# Patient Record
Sex: MALE | HISPANIC OR LATINO | Employment: FULL TIME | URBAN - METROPOLITAN AREA
[De-identification: names, ages, dates, MRNs, and addresses within clinical notes are randomized per-mention and may not be internally consistent; named-entity substitution may affect disease eponyms.]

---

## 2021-02-01 ENCOUNTER — TELEPHONE (OUTPATIENT)
Dept: SCHEDULING | Facility: IMAGING CENTER | Age: 36
End: 2021-02-01

## 2021-02-19 ENCOUNTER — OFFICE VISIT (OUTPATIENT)
Dept: MEDICAL GROUP | Facility: MEDICAL CENTER | Age: 36
End: 2021-02-19
Payer: COMMERCIAL

## 2021-02-19 VITALS
OXYGEN SATURATION: 96 % | WEIGHT: 167 LBS | HEART RATE: 77 BPM | BODY MASS INDEX: 21.43 KG/M2 | SYSTOLIC BLOOD PRESSURE: 100 MMHG | HEIGHT: 74 IN | DIASTOLIC BLOOD PRESSURE: 60 MMHG | TEMPERATURE: 97.9 F | RESPIRATION RATE: 16 BRPM

## 2021-02-19 DIAGNOSIS — F31.12 BIPOLAR AFFECTIVE DISORDER, CURRENTLY MANIC, MODERATE (HCC): ICD-10-CM

## 2021-02-19 DIAGNOSIS — R74.01 TRANSAMINITIS: ICD-10-CM

## 2021-02-19 DIAGNOSIS — Z23 NEED FOR VACCINATION: ICD-10-CM

## 2021-02-19 DIAGNOSIS — Z00.00 PREVENTATIVE HEALTH CARE: ICD-10-CM

## 2021-02-19 DIAGNOSIS — F11.21 HISTORY OF NARCOTIC ADDICTION (HCC): ICD-10-CM

## 2021-02-19 PROBLEM — F31.9 BIPOLAR DISORDER (HCC): Status: ACTIVE | Noted: 2021-02-19

## 2021-02-19 PROBLEM — F10.21 HISTORY OF ALCOHOL DEPENDENCE (HCC): Status: ACTIVE | Noted: 2021-02-19

## 2021-02-19 PROCEDURE — 90471 IMMUNIZATION ADMIN: CPT | Performed by: PHYSICIAN ASSISTANT

## 2021-02-19 PROCEDURE — 99204 OFFICE O/P NEW MOD 45 MIN: CPT | Mod: 25 | Performed by: PHYSICIAN ASSISTANT

## 2021-02-19 PROCEDURE — 90715 TDAP VACCINE 7 YRS/> IM: CPT | Performed by: PHYSICIAN ASSISTANT

## 2021-02-19 RX ORDER — NALTREXONE HYDROCHLORIDE 50 MG/1
50 TABLET, FILM COATED ORAL
COMMUNITY
Start: 2021-02-17

## 2021-02-19 RX ORDER — RISPERIDONE 2 MG/1
2 TABLET ORAL
COMMUNITY
Start: 2021-02-05

## 2021-02-19 RX ORDER — TRAZODONE HYDROCHLORIDE 300 MG/1
300 TABLET ORAL
COMMUNITY
Start: 2021-02-17

## 2021-02-19 ASSESSMENT — PATIENT HEALTH QUESTIONNAIRE - PHQ9
SUM OF ALL RESPONSES TO PHQ QUESTIONS 1-9: 12
CLINICAL INTERPRETATION OF PHQ2 SCORE: 2
5. POOR APPETITE OR OVEREATING: 2 - MORE THAN HALF THE DAYS

## 2021-02-19 NOTE — PROGRESS NOTES
Subjective:   CC:   Chief Complaint   Patient presents with   • Establish Care       Jean Pierre Duffy is a 36 y.o. male here today for establishing care.       HPI:  Patient has bipolar disorder that was diagnosed at age of 11.  Currently sees a psychiatrist, and takes risperidone and trazodone.  Patient has history of opiate dependency, shooting up heroin.  States once he stopped that gradually he started drinking heavily.  He was recently at [facility], for alcoholism rehab.  You should drink more than 750 ml of hard liquor daily.  He has had some relapses, his last drink was last night 4 drinks.  Last blood work shows sever  Elevation of AST and ALT.denies any right upper quadrant pain.  States his appetite is normal.  Currently takes naltrexone 50 mg daily to help with craving for alcohol.  States he has a good family support.    Current medicines (including changes today)  Current Outpatient Medications   Medication Sig Dispense Refill   • risperiDONE (RISPERDAL) 2 MG Tab Take 2 mg by mouth.     • trazodone (DESYREL) 300 MG tablet Take 300 mg by mouth.     • naltrexone (DEPADE) 50 MG Tab Take 50 mg by mouth.     • FOLIC ACID PO Take  by mouth.     • MAGNESIUM PO Take  by mouth.     • MILK THISTLE PO Take  by mouth.     • Ascorbic Acid (VITAMIN C PO) Take  by mouth.     • VITAMIN D PO Take  by mouth.     • ondansetron (ZOFRAN ODT) 4 MG TBDP Take 1 Tab by mouth every 8 hours as needed for Nausea/Vomiting. (Patient not taking: Reported on 2/19/2021) 10 Tab 0   • LITHIUM CARBONATE 300 MG PO TABS      • SEROQUEL 100 MG PO TABS 2 times a day.      • IBUPROFEN        No current facility-administered medications for this visit.         Past medical, surgical, family, and social history are reviewed in Epic chart by me today.   Medications and allergies reviewed in Epic chart by me today.       ROS   No chest pain, no shortness of breath, no abdominal pain  As documented in history of present illness  "above     Objective:     /60 (BP Location: Left arm, Patient Position: Sitting, BP Cuff Size: Adult)   Pulse 77   Temp 36.6 °C (97.9 °F) (Temporal)   Resp 16   Ht 1.88 m (6' 2\")   Wt 75.8 kg (167 lb)   SpO2 96%  Body mass index is 21.44 kg/m².   Physical Exam:  Constitutional: Alert, oriented in no acute distress.  Psych: Eye contact is good, speech goal directed, affect calm  Eyes: Conjunctiva non-injected, sclera non-icteric.  ENMT: Ears:Pinna normal. TM pearly gray.               Lips without lesions, Clear oropharynx, mucous membranes pink and moist.  Neck: No cervical or supraclavicular lymphadenopathy,Trachea midline, no thyromegaly, no masses  Lungs: Unlabored respiratory effort, clear to auscultation bilaterally with good excursion, no wheez or rhonci  CV: regular rate and rhythm. No lower extremity edema  Abdomen: soft, nontender, No CVAT, did not note ascites  Skin: no lesions in visible areas. No jaundice  Ext: no edema, color normal, vascularity normal, temperature normal        Assessment and Plan:   The following treatment plan was discussed    1. Need for vaccination    - TDAP VACCINE =>8YO IM    2. Bipolar affective disorder, currently manic, moderate (HCC)      3. History of narcotic addiction (HCC)      4. Transaminitis    - Comp Metabolic Panel; Future  - US-RUQ; Future    5. Preventative health care    - CBC WITH DIFFERENTIAL; Future  - TSH WITH REFLEX TO FT4; Future  - VITAMIN D,25 HYDROXY; Future    Encourage patient to stay sober and avoid alcohol, discussed long-term complications of hepatic cirrhosis.  Patient states his uncle actually passed away from it and he is familiar with it.    Followup: 8 wks          Please note that this dictation was created using voice recognition software. I have made every reasonable attempt to correct obvious errors, but I expect that there are errors of grammar and possibly content that I did not discover before finalizing the note.    "

## 2021-02-19 NOTE — LETTER
[facility]  Andra Temple P.A.-C.  [address]  [place] [address]  Fax: [phone]   Authorization for Release/Disclosure of   Protected Health Information   Name: FELIBERTO MARTINEZ : 1985 SSN: [id number]   Address: [address]   [place] [address] Phone:    [phone] (home)    I authorize the entity listed below to release/disclose the PHI below to:   [facility]/Andra Temple P.A.-C. and Andra Temple P.A.-C.   Provider or Entity Name:     Address   City, State, Zip   Phone:      Fax:     Reason for request: continuity of care   Information to be released:    [  ] LAST COLONOSCOPY,  including any PATH REPORT and follow-up  [  ] LAST FIT/COLOGUARD RESULT [  ] LAST DEXA  [  ] LAST MAMMOGRAM  [  ] LAST PAP  [  ] LAST LABS [  ] RETINA EXAM REPORT  [  ] IMMUNIZATION RECORDS  [  ] Release all info      [  ] Check here and initial the line next to each item to release ALL health information INCLUDING  _____ Care and treatment for drug and / or alcohol abuse  _____ HIV testing, infection status, or AIDS  _____ Genetic Testing    DATES OF SERVICE OR TIME PERIOD TO BE DISCLOSED: _____________  I understand and acknowledge that:  * This Authorization may be revoked at any time by you in writing, except if your health information has already been used or disclosed.  * Your health information that will be used or disclosed as a result of you signing this authorization could be re-disclosed by the recipient. If this occurs, your re-disclosed health information may no longer be protected by State or Federal laws.  * You may refuse to sign this Authorization. Your refusal will not affect your ability to obtain treatment.  * This Authorization becomes effective upon signing and will  on (date) __________.      If no date is indicated, this Authorization will  one (1) year from the signature date.    Name: Feliberto Martinez    Signature:   Date:          2/19/2021       PLEASE FAX REQUESTED RECORDS BACK TO: [phone]

## 2021-03-08 ENCOUNTER — HOSPITAL ENCOUNTER (OUTPATIENT)
Dept: RADIOLOGY | Facility: MEDICAL CENTER | Age: 36
End: 2021-03-08
Attending: PHYSICIAN ASSISTANT
Payer: COMMERCIAL

## 2021-03-08 DIAGNOSIS — R74.01 TRANSAMINITIS: ICD-10-CM

## 2021-03-08 PROCEDURE — 76705 ECHO EXAM OF ABDOMEN: CPT

## 2021-03-10 ENCOUNTER — TELEPHONE (OUTPATIENT)
Dept: MEDICAL GROUP | Facility: MEDICAL CENTER | Age: 36
End: 2021-03-10

## 2021-03-11 ENCOUNTER — HOSPITAL ENCOUNTER (OUTPATIENT)
Dept: LAB | Facility: MEDICAL CENTER | Age: 36
End: 2021-03-11
Attending: PHYSICIAN ASSISTANT
Payer: COMMERCIAL

## 2021-03-11 DIAGNOSIS — Z00.00 PREVENTATIVE HEALTH CARE: ICD-10-CM

## 2021-03-11 DIAGNOSIS — R74.01 TRANSAMINITIS: ICD-10-CM

## 2021-03-11 LAB
25(OH)D3 SERPL-MCNC: 60 NG/ML (ref 30–100)
ALBUMIN SERPL BCP-MCNC: 4.5 G/DL (ref 3.2–4.9)
ALBUMIN/GLOB SERPL: 1.5 G/DL
ALP SERPL-CCNC: 59 U/L (ref 30–99)
ALT SERPL-CCNC: 23 U/L (ref 2–50)
ANION GAP SERPL CALC-SCNC: 12 MMOL/L (ref 7–16)
AST SERPL-CCNC: 30 U/L (ref 12–45)
BILIRUB SERPL-MCNC: 0.4 MG/DL (ref 0.1–1.5)
BUN SERPL-MCNC: 11 MG/DL (ref 8–22)
CALCIUM SERPL-MCNC: 10.2 MG/DL (ref 8.5–10.5)
CHLORIDE SERPL-SCNC: 97 MMOL/L (ref 96–112)
CO2 SERPL-SCNC: 28 MMOL/L (ref 20–33)
CREAT SERPL-MCNC: 0.9 MG/DL (ref 0.5–1.4)
GLOBULIN SER CALC-MCNC: 3 G/DL (ref 1.9–3.5)
GLUCOSE SERPL-MCNC: 82 MG/DL (ref 65–99)
POTASSIUM SERPL-SCNC: 4.7 MMOL/L (ref 3.6–5.5)
PROT SERPL-MCNC: 7.5 G/DL (ref 6–8.2)
SODIUM SERPL-SCNC: 137 MMOL/L (ref 135–145)
TSH SERPL DL<=0.005 MIU/L-ACNC: 0.97 UIU/ML (ref 0.38–5.33)

## 2021-03-11 PROCEDURE — 84443 ASSAY THYROID STIM HORMONE: CPT

## 2021-03-11 PROCEDURE — 80053 COMPREHEN METABOLIC PANEL: CPT

## 2021-03-11 PROCEDURE — 85025 COMPLETE CBC W/AUTO DIFF WBC: CPT

## 2021-03-11 PROCEDURE — 36415 COLL VENOUS BLD VENIPUNCTURE: CPT

## 2021-03-11 PROCEDURE — 82306 VITAMIN D 25 HYDROXY: CPT

## 2021-03-11 NOTE — TELEPHONE ENCOUNTER
----- Message from Andra Temple P.A.-C. sent at 3/10/2021  3:13 PM PST -----  Please inform patient that       Liver ultrasound shows fatty liver however no sign of cirrhosis yet.    Andra Temple P.A.-C.

## 2021-03-11 NOTE — TELEPHONE ENCOUNTER
1. Caller Name: Jean Pierre Duffy                          Call Back Number: [phone] (home)        Pt returned call,informed of results,voiced understanding.

## 2021-03-12 LAB
BASOPHILS # BLD AUTO: 0.5 % (ref 0–1.8)
BASOPHILS # BLD: 0.03 K/UL (ref 0–0.12)
EOSINOPHIL # BLD AUTO: 0.25 K/UL (ref 0–0.51)
EOSINOPHIL NFR BLD: 4.5 % (ref 0–6.9)
ERYTHROCYTE [DISTWIDTH] IN BLOOD BY AUTOMATED COUNT: 39.8 FL (ref 35.9–50)
HCT VFR BLD AUTO: 44.9 % (ref 42–52)
HGB BLD-MCNC: 14.2 G/DL (ref 14–18)
IMM GRANULOCYTES # BLD AUTO: 0.03 K/UL (ref 0–0.11)
IMM GRANULOCYTES NFR BLD AUTO: 0.5 % (ref 0–0.9)
LYMPHOCYTES # BLD AUTO: 1.06 K/UL (ref 1–4.8)
LYMPHOCYTES NFR BLD: 19.1 % (ref 22–41)
MCH RBC QN AUTO: 28.5 PG (ref 27–33)
MCHC RBC AUTO-ENTMCNC: 31.6 G/DL (ref 33.7–35.3)
MCV RBC AUTO: 90 FL (ref 81.4–97.8)
MONOCYTES # BLD AUTO: 0.6 K/UL (ref 0–0.85)
MONOCYTES NFR BLD AUTO: 10.8 % (ref 0–13.4)
NEUTROPHILS # BLD AUTO: 3.57 K/UL (ref 1.82–7.42)
NEUTROPHILS NFR BLD: 64.6 % (ref 44–72)
NRBC # BLD AUTO: 0 K/UL
NRBC BLD-RTO: 0 /100 WBC
PLATELET # BLD AUTO: 193 K/UL (ref 164–446)
PMV BLD AUTO: 11 FL (ref 9–12.9)
RBC # BLD AUTO: 4.99 M/UL (ref 4.7–6.1)
WBC # BLD AUTO: 5.5 K/UL (ref 4.8–10.8)

## 2021-03-17 ENCOUNTER — TELEPHONE (OUTPATIENT)
Dept: MEDICAL GROUP | Facility: MEDICAL CENTER | Age: 36
End: 2021-03-17

## 2021-03-17 NOTE — TELEPHONE ENCOUNTER
Phone Number Called: [phone] (home)       Call outcome: Spoke to patient regarding message below.    Message: Pt informed,voiced understanding.      ----- Message from Andra Temple P.A.-C. sent at 3/17/2021  3:24 PM PDT -----  Please inform patient that   All labs look stable without any significant abnormal findings.   Liver enzymes are within normal limits.    Andra Temple P.A.-C.